# Patient Record
Sex: FEMALE | HISPANIC OR LATINO | Employment: OTHER | URBAN - METROPOLITAN AREA
[De-identification: names, ages, dates, MRNs, and addresses within clinical notes are randomized per-mention and may not be internally consistent; named-entity substitution may affect disease eponyms.]

---

## 2023-06-01 ENCOUNTER — HOSPITAL ENCOUNTER (EMERGENCY)
Facility: CLINIC | Age: 23
Discharge: HOME | End: 2023-06-01
Attending: EMERGENCY MEDICINE | Admitting: EMERGENCY MEDICINE

## 2023-06-01 ENCOUNTER — APPOINTMENT (OUTPATIENT)
Dept: CT IMAGING | Facility: CLINIC | Age: 23
End: 2023-06-01
Attending: EMERGENCY MEDICINE

## 2023-06-01 VITALS
TEMPERATURE: 98.2 F | SYSTOLIC BLOOD PRESSURE: 103 MMHG | HEART RATE: 94 BPM | DIASTOLIC BLOOD PRESSURE: 46 MMHG | OXYGEN SATURATION: 100 % | RESPIRATION RATE: 16 BRPM

## 2023-06-01 DIAGNOSIS — R82.90 ABNORMAL URINALYSIS: ICD-10-CM

## 2023-06-01 DIAGNOSIS — R10.31 ABDOMINAL PAIN, RIGHT LOWER QUADRANT: ICD-10-CM

## 2023-06-01 LAB
ALBUMIN UR-MCNC: NEGATIVE MG/DL
ANION GAP SERPL CALCULATED.3IONS-SCNC: 9 MMOL/L (ref 7–15)
APPEARANCE UR: ABNORMAL
BACTERIA #/AREA URNS HPF: ABNORMAL /HPF
BASOPHILS # BLD AUTO: 0 10E3/UL (ref 0–0.2)
BASOPHILS NFR BLD AUTO: 0 %
BILIRUB UR QL STRIP: NEGATIVE
BUN SERPL-MCNC: 6 MG/DL (ref 6–20)
CALCIUM SERPL-MCNC: 9.3 MG/DL (ref 8.6–10)
CHLORIDE SERPL-SCNC: 102 MMOL/L (ref 98–107)
COLOR UR AUTO: ABNORMAL
CREAT SERPL-MCNC: 0.62 MG/DL (ref 0.51–0.95)
DEPRECATED HCO3 PLAS-SCNC: 24 MMOL/L (ref 22–29)
EOSINOPHIL # BLD AUTO: 0 10E3/UL (ref 0–0.7)
EOSINOPHIL NFR BLD AUTO: 0 %
ERYTHROCYTE [DISTWIDTH] IN BLOOD BY AUTOMATED COUNT: 12.8 % (ref 10–15)
GFR SERPL CREATININE-BSD FRML MDRD: >90 ML/MIN/1.73M2
GLUCOSE SERPL-MCNC: 122 MG/DL (ref 70–99)
GLUCOSE UR STRIP-MCNC: NEGATIVE MG/DL
HCG SERPL QL: NEGATIVE
HCT VFR BLD AUTO: 40 % (ref 35–47)
HGB BLD-MCNC: 13.3 G/DL (ref 11.7–15.7)
HGB UR QL STRIP: NEGATIVE
HOLD SPECIMEN: NORMAL
IMM GRANULOCYTES # BLD: 0.1 10E3/UL
IMM GRANULOCYTES NFR BLD: 1 %
KETONES UR STRIP-MCNC: NEGATIVE MG/DL
LEUKOCYTE ESTERASE UR QL STRIP: ABNORMAL
LYMPHOCYTES # BLD AUTO: 1.8 10E3/UL (ref 0.8–5.3)
LYMPHOCYTES NFR BLD AUTO: 17 %
MCH RBC QN AUTO: 31 PG (ref 26.5–33)
MCHC RBC AUTO-ENTMCNC: 33.3 G/DL (ref 31.5–36.5)
MCV RBC AUTO: 93 FL (ref 78–100)
MONOCYTES # BLD AUTO: 0.7 10E3/UL (ref 0–1.3)
MONOCYTES NFR BLD AUTO: 6 %
MUCOUS THREADS #/AREA URNS LPF: PRESENT /LPF
NEUTROPHILS # BLD AUTO: 8.1 10E3/UL (ref 1.6–8.3)
NEUTROPHILS NFR BLD AUTO: 76 %
NITRATE UR QL: NEGATIVE
NRBC # BLD AUTO: 0 10E3/UL
NRBC BLD AUTO-RTO: 0 /100
PH UR STRIP: 6 [PH] (ref 5–7)
PLATELET # BLD AUTO: 264 10E3/UL (ref 150–450)
POTASSIUM SERPL-SCNC: 4.2 MMOL/L (ref 3.4–5.3)
RBC # BLD AUTO: 4.29 10E6/UL (ref 3.8–5.2)
RBC URINE: 1 /HPF
SODIUM SERPL-SCNC: 135 MMOL/L (ref 136–145)
SP GR UR STRIP: 1.01 (ref 1–1.03)
SPERM #/AREA URNS HPF: PRESENT /HPF
SQUAMOUS EPITHELIAL: 9 /HPF
UROBILINOGEN UR STRIP-MCNC: NORMAL MG/DL
WBC # BLD AUTO: 10.7 10E3/UL (ref 4–11)
WBC URINE: 7 /HPF

## 2023-06-01 PROCEDURE — 96374 THER/PROPH/DIAG INJ IV PUSH: CPT | Mod: 59

## 2023-06-01 PROCEDURE — 87086 URINE CULTURE/COLONY COUNT: CPT | Performed by: EMERGENCY MEDICINE

## 2023-06-01 PROCEDURE — 250N000011 HC RX IP 250 OP 636: Performed by: EMERGENCY MEDICINE

## 2023-06-01 PROCEDURE — 84703 CHORIONIC GONADOTROPIN ASSAY: CPT | Performed by: EMERGENCY MEDICINE

## 2023-06-01 PROCEDURE — 82310 ASSAY OF CALCIUM: CPT | Performed by: EMERGENCY MEDICINE

## 2023-06-01 PROCEDURE — 74177 CT ABD & PELVIS W/CONTRAST: CPT

## 2023-06-01 PROCEDURE — 85014 HEMATOCRIT: CPT | Performed by: EMERGENCY MEDICINE

## 2023-06-01 PROCEDURE — 99285 EMERGENCY DEPT VISIT HI MDM: CPT | Mod: 25

## 2023-06-01 PROCEDURE — 81001 URINALYSIS AUTO W/SCOPE: CPT | Performed by: EMERGENCY MEDICINE

## 2023-06-01 PROCEDURE — 85025 COMPLETE CBC W/AUTO DIFF WBC: CPT | Performed by: EMERGENCY MEDICINE

## 2023-06-01 PROCEDURE — 96375 TX/PRO/DX INJ NEW DRUG ADDON: CPT

## 2023-06-01 PROCEDURE — 80048 BASIC METABOLIC PNL TOTAL CA: CPT | Performed by: EMERGENCY MEDICINE

## 2023-06-01 PROCEDURE — 36415 COLL VENOUS BLD VENIPUNCTURE: CPT | Performed by: EMERGENCY MEDICINE

## 2023-06-01 RX ORDER — IBUPROFEN 600 MG/1
600 TABLET, FILM COATED ORAL EVERY 6 HOURS PRN
Qty: 30 TABLET | Refills: 0 | Status: SHIPPED | OUTPATIENT
Start: 2023-06-01

## 2023-06-01 RX ORDER — IOPAMIDOL 755 MG/ML
500 INJECTION, SOLUTION INTRAVASCULAR ONCE
Status: COMPLETED | OUTPATIENT
Start: 2023-06-01 | End: 2023-06-01

## 2023-06-01 RX ORDER — HYDROMORPHONE HYDROCHLORIDE 1 MG/ML
0.5 INJECTION, SOLUTION INTRAMUSCULAR; INTRAVENOUS; SUBCUTANEOUS
Status: DISCONTINUED | OUTPATIENT
Start: 2023-06-01 | End: 2023-06-01 | Stop reason: HOSPADM

## 2023-06-01 RX ORDER — KETOROLAC TROMETHAMINE 15 MG/ML
15 INJECTION, SOLUTION INTRAMUSCULAR; INTRAVENOUS ONCE
Status: COMPLETED | OUTPATIENT
Start: 2023-06-01 | End: 2023-06-01

## 2023-06-01 RX ORDER — DICYCLOMINE HCL 20 MG
20 TABLET ORAL 4 TIMES DAILY PRN
Qty: 15 TABLET | Refills: 0 | Status: SHIPPED | OUTPATIENT
Start: 2023-06-01

## 2023-06-01 RX ADMIN — IOPAMIDOL 75 ML: 755 INJECTION, SOLUTION INTRAVENOUS at 14:22

## 2023-06-01 RX ADMIN — KETOROLAC TROMETHAMINE 15 MG: 15 INJECTION, SOLUTION INTRAMUSCULAR; INTRAVENOUS at 14:38

## 2023-06-01 RX ADMIN — HYDROMORPHONE HYDROCHLORIDE 0.5 MG: 1 INJECTION, SOLUTION INTRAMUSCULAR; INTRAVENOUS; SUBCUTANEOUS at 14:38

## 2023-06-01 ASSESSMENT — ACTIVITIES OF DAILY LIVING (ADL): ADLS_ACUITY_SCORE: 35

## 2023-06-01 NOTE — ED PROVIDER NOTES
History     Chief Complaint:  Flank Pain       HPI professional  utilized for history    Elaine Carmen is a 23 year old female presents with right flank/abdominal pain.  Patient had the gradual onset of pain beginning yesterday.  Pain begins in the right flank/low back and radiates to the right lower quadrant.  Pain has been relatively constant without any alleviating or aggravating factors.  She denies any nausea, vomiting, diarrhea, dysuria, urinary frequency, vaginal bleeding or discharge.  She reported fever but states that she felt warm and never documented a temperature.  She has no history of similar pain and denies prior intra-abdominal surgery.    Independent Historian:     None    Review of External Notes:  None    Medications:    None    Past Medical History:    None    Past Surgical History:    No past surgical history on file.       Physical Exam   Patient Vitals for the past 24 hrs:   BP Temp Temp src Pulse Resp SpO2   06/01/23 1608 103/46 -- -- 94 16 100 %   06/01/23 1444 105/80 -- -- 106 -- 98 %   06/01/23 1435 105/54 -- -- 104 -- 99 %   06/01/23 1012 119/69 98.2  F (36.8  C) Temporal 108 18 100 %        Physical Exam    Eyes:    Conjunctiva normal  Neck:    Supple, no meningismus.     CV:     Regular rate and rhythm.      No murmurs, rubs or gallops.     No lower extremity edema.  PULM:    Clear to auscultation bilateral.       No respiratory distress.      Good air exchange.     No rales or wheezing.  ABD:    Soft, non-distended.       Minimal tenderness to right lower quadrant     Bowel sounds normal.     No pulsatile masses.       No rebound, guarding or rigidity.     No CVA tenderness.   MSK:     No gross deformity to all four extremities.   LYMPH:   No cervical lymphadenopathy.  NEURO:   Alert.  Good muscular tone, no atrophy.   Skin:    Warm, dry and intact.    Psych:    Mood is good and affect is appropriate.      Emergency Department Course     Imaging:  CT  Abdomen Pelvis w Contrast   Final Result   IMPRESSION:    1. No evidence of acute pathology in the abdomen or pelvis. The   appendix is visualized and appears normal.   2. Prominent pelvic vasculature, nonspecific, can be seen with pelvic   congestion syndrome.      DEYSI YI MD            SYSTEM ID:  [id number]        Report per radiology    Laboratory:  Labs Ordered and Resulted from Time of ED Arrival to Time of ED Departure   ROUTINE UA WITH MICROSCOPIC REFLEX TO CULTURE - Abnormal       Result Value    Color Urine Light Yellow      Appearance Urine Slightly Cloudy (*)     Glucose Urine Negative      Bilirubin Urine Negative      Ketones Urine Negative      Specific Gravity Urine 1.009      Blood Urine Negative      pH Urine 6.0      Protein Albumin Urine Negative      Urobilinogen Urine Normal      Nitrite Urine Negative      Leukocyte Esterase Urine Small (*)     Bacteria Urine Few (*)     Mucus Urine Present (*)     Sperm Urine Present (*)     RBC Urine 1      WBC Urine 7 (*)     Squamous Epithelials Urine 9 (*)    BASIC METABOLIC PANEL - Abnormal    Sodium 135 (*)     Potassium 4.2      Chloride 102      Carbon Dioxide (CO2) 24      Anion Gap 9      Urea Nitrogen 6.0      Creatinine 0.62      Calcium 9.3      Glucose 122 (*)     GFR Estimate >90     HCG QUALITATIVE PREGNANCY - Normal    hCG Serum Qualitative Negative     CBC WITH PLATELETS AND DIFFERENTIAL    WBC Count 10.7      RBC Count 4.29      Hemoglobin 13.3      Hematocrit 40.0      MCV 93      MCH 31.0      MCHC 33.3      RDW 12.8      Platelet Count 264      % Neutrophils 76      % Lymphocytes 17      % Monocytes 6      % Eosinophils 0      % Basophils 0      % Immature Granulocytes 1      NRBCs per 100 WBC 0      Absolute Neutrophils 8.1      Absolute Lymphocytes 1.8      Absolute Monocytes 0.7      Absolute Eosinophils 0.0      Absolute Basophils 0.0      Absolute Immature Granulocytes 0.1      Absolute NRBCs 0.0     URINE CULTURE             Emergency Department Course & Assessments:      Interventions:  Medications   HYDROmorphone (PF) (DILAUDID) injection 0.5 mg (0.5 mg Intravenous $Given 23 1438)   ketorolac (TORADOL) injection 15 mg (15 mg Intravenous $Given 23 1438)   sodium chloride (PF) 0.9% PF flush 100 mL (59 mLs Intravenous $Given 23 1422)   iopamidol (ISOVUE-370) solution 500 mL (75 mLs Intravenous $Given 23 1422)        Independent Interpretation (X-rays, CTs, rhythm strip):  None    Consultations/Discussion of Management or Tests:  None    Social Determinants of Health affecting care:  None    Disposition:  The patient was discharged to home.     Impression & Plan        Medical Decision Makin-year-old female presents with gradual onset of right flank/right lower quadrant abdominal pain.  Basic laboratory studies were unremarkable outside a contaminated urine specimen.  She is not having dysuria or urinary frequency to suggest UTI thus will send urine culture but not initiate antibiotics.  CT scan of the abdomen is unremarkable outside pelvic congestion.  Patient has complete resolution of symptoms with Toradol.  Differential diagnosis would include IBS, IBD, viral illness, enteritis, colitis, endometriosis, pelvic congestion syndrome.  She has no historical features to suggest PID.  Patient will discharge home with supportive measures and return to ED for any worsening symptoms.  Follow-up with PCP to ensure improvement.    Diagnosis:    ICD-10-CM    1. Abdominal pain, right lower quadrant  R10.31       2. Abnormal urinalysis  R82.90            Discharge Medications:  Discharge Medication List as of 2023  4:01 PM      START taking these medications    Details   dicyclomine (BENTYL) 20 MG tablet Take 1 tablet (20 mg) by mouth 4 times daily as needed (abdominal pain), Disp-15 tablet, R-0, Local Print      ibuprofen (ADVIL/MOTRIN) 600 MG tablet Take 1 tablet (600 mg) by mouth every 6 hours as needed for  moderate pain, Disp-30 tablet, R-0, Local Print              Chad Dhillon MD, MD  06/01/23 0364

## 2023-06-01 NOTE — ED TRIAGE NOTES
Right flank pain. Started yesterday. Reports fever at home. Denies N/V/D. No problems with urination. VSS. ABCs intact. A/Ox4.

## 2023-06-03 ENCOUNTER — TELEPHONE (OUTPATIENT)
Dept: EMERGENCY MEDICINE | Facility: CLINIC | Age: 23
End: 2023-06-03

## 2023-06-03 LAB
BACTERIA UR CULT: ABNORMAL
BACTERIA UR CULT: ABNORMAL

## 2023-06-03 NOTE — RESULT ENCOUNTER NOTE
Phone number is invalid.  Unable to leave a voicemail message requesting a call back to [facility] ED Lab Result RN at [phone].  RN is available every day between 9 a.m. and 5:30 p.m.    Letter sent requesting a call back

## 2023-06-03 NOTE — LETTER
Mallory 3, 2023        Elaine Carmen  [address]   [place] [address]          Dear Elaine Carmen:    You were seen in the [facility] Emergency Department at [facility] EMERGENCY DEPT on 6/1/2023.  We are unable to reach you by phone, so we are sending you this letter.     It is important that you call [facility] Emergency Department lab result nurse at [phone], as we have information to relay to you AND/OR we MAY have to make some changes in your treatment.    Best time to call back is between 9AM and 5:30PM, 7 days a week.      Sincerely,     [facility] Emergency Department Lab Result RN  [phone]

## 2023-06-03 NOTE — RESULT ENCOUNTER NOTE
Preliminary urine culture report on 6/3/23 shows the presence of bacteria(s):  [1] >100,000 CFU/ML Escherichia coli AND [2] >100,000 CFU/ML gram negative bacilli   Emergency Dept/Urgent Care discharge antibiotic: None  Recommendations per [facility] ED Lab result Urine culture protocol.

## 2023-06-03 NOTE — TELEPHONE ENCOUNTER
[facility] Emergency Department/Urgent Care Lab result notification  [Note:  ED Lab Results RN will reference the [facility] Emergency Dept visit note prior to contacting patient AND/OR prior to consulting Emergency Dept Provider.  Highlights of Emergency Dept visit in information summary at the bottom of this telephone note]    1. Reason for call    Notify of lab results    Assess patient symptoms [if necessary]    Review ED Providers recommendations/discharge instructions (if necessary)    Advise per [facility] ED lab result protocol    2. Lab Result (including Rx patient on, if applicable).  If culture, copy of lab report at bottom.  Preliminary urine culture report on 6/3/23 shows the presence of bacteria(s):  [1] >100,000 CFU/ML Escherichia coli AND [2] >100,000 CFU/ML gram negative bacilli   Emergency Dept/Urgent Care discharge antibiotic: None  Recommendations per [facility] Lab result Urine culture protocol.    3. RN Assessment (Patient's current Symptoms):    Time of call: 2:25P via .      4. RN Recommendations/Instructions per [place] ED lab result protocol    [facility] ED lab result protocol used: Urine culture    Elaine was not notified of lab result and treatment recommendations.  PHone number is invalid.  RN will sent letter requesting a call back    Information summary from Emergency Dept/Urgent Care visit on 6/1/23  Symptoms reported at ED visit (Chief complaint, HPI) Chief Complaint:  Flank Pain      HPI professional  utilized for history     Elaine Carmen is a 23 year old female presents with right flank/abdominal pain.  Patient had the gradual onset of pain beginning yesterday.  Pain begins in the right flank/low back and radiates to the right lower quadrant.  Pain has been relatively constant without any alleviating or aggravating factors.  She denies any nausea, vomiting, diarrhea, dysuria, urinary frequency,  vaginal bleeding or discharge.  She reported fever but states that she felt warm and never documented a temperature.  She has no history of similar pain and denies prior intra-abdominal surgery.   Significant Medical hx, if applicable (i.e. CKD, diabetes) Reviewed   Allergies No Known Allergies   Weight, if applicable Wt Readings from Last 2 Encounters:   No data found for Wt      Coumadin/Warfarin [Yes /No] No   Creatinine Level (mg/dl) Creatinine   Date Value Ref Range Status   2023 0.62 0.51 - 0.95 mg/dL Final      Creatinine clearance (ml/min), if applicable Creatinine clearance cannot be calculated (Unknown ideal weight.)   Pregnant (Yes/No/NA) HCG qual Negative   Breastfeeding (Yes/No/NA) ?   ED providers Impression and Plan (applicable information) Medical Decision Makin-year-old female presents with gradual onset of right flank/right lower quadrant abdominal pain.  Basic laboratory studies were unremarkable outside a contaminated urine specimen.  She is not having dysuria or urinary frequency to suggest UTI thus will send urine culture but not initiate antibiotics.  CT scan of the abdomen is unremarkable outside pelvic congestion.  Patient has complete resolution of symptoms with Toradol.  Differential diagnosis would include IBS, IBD, viral illness, enteritis, colitis, endometriosis, pelvic congestion syndrome.  She has no historical features to suggest PID.  Patient will discharge home with supportive measures and return to ED for any worsening symptoms.  Follow-up with PCP to ensure improvement.   ED diagnosis  Abdominal pain, right lower quadrant  Abnormal urinalysis   ED provider Chad Mai MD        Copy of Lab report (if applicable)        Alex Sepulveda RN  [facility]  Emergency Dept Lab Result RN  Ph# [phone]

## 2023-06-04 NOTE — RESULT ENCOUNTER NOTE
Final urine culture on 6/3/23 shows the presence of bacteria(s): [1] >100,000 CFU/ML Escherichia coli  AND [2] >100,000 CFU/ML Escherichia coli   [facility] Emergency Dept discharge antibiotic: None  Recommendations in treatment per [facility] ED lab result Urine Culture protocol.    Unable to reach as phone number is invalid.  Letter to be sent 6/5/23

## 2023-06-07 NOTE — TELEPHONE ENCOUNTER
[facility] Emergency Department/Urgent Care Lab result notification  [Note:  ED Lab Results RN will reference the [facility] Emergency Dept visit note prior to contacting patient AND/OR prior to consulting Emergency Dept Provider.  Highlights of Emergency Dept visit in information summary at the bottom of this telephone note]    1. Reason for call    Notify of lab results    Assess patient symptoms [if necessary]    Review ED Providers recommendations/discharge instructions (if necessary)    Advise per [facility] ED lab result protocol    2. Lab Result (including Rx patient on, if applicable).  If culture, copy of lab report at bottom.  Final urine culture on 6/3/23 shows the presence of bacteria(s): [1] >100,000 CFU/ML Escherichia coli  AND [2] >100,000 CFU/ML Escherichia coli   [facility] Emergency Dept discharge antibiotic: None  Recommendations in treatment per [facility] lab result Urine Culture protocol.       3. RN Assessment (Patient's current Symptoms):    Time of call: 4:56p patient calling in regards to letter received from ED lab results department,  used [phone] Magalie    Assessment: Relayed urine culture results to patient. She denies any pain or frequency or urgency, no fever chills. Discussed with patient that if she does not have symptoms she does not need to start antibiotics, to retest her urine in 1 week. Patient stated she has a clinic that she can go to to have her urine checked again.    4. RN Recommendations/Instructions per [place] ED lab result protocol    [facility] ED lab result protocol used: Urine Culture    Elaine was notified of lab result and treatment recommendations    Advised to discontinue current antibiotic NA      5. Please Contact your PCP clinic or return to the Emergency department if your:    Symptoms return.    Symptoms do not improve after 3 days on antibiotic.    Symptoms do not resolve after completing  antibiotic.    Symptoms worsen or other concerning symptoms.      Copy of Lab report (if applicable)      Rebecca Buckner RN  [facility]  Emergency Dept Lab Result RN  Ph# [phone]

## 2023-12-14 ENCOUNTER — APPOINTMENT (OUTPATIENT)
Dept: GENERAL RADIOLOGY | Facility: CLINIC | Age: 23
End: 2023-12-14
Attending: EMERGENCY MEDICINE

## 2023-12-14 ENCOUNTER — HOSPITAL ENCOUNTER (EMERGENCY)
Facility: CLINIC | Age: 23
Discharge: HOME | End: 2023-12-14
Attending: EMERGENCY MEDICINE | Admitting: EMERGENCY MEDICINE

## 2023-12-14 ENCOUNTER — APPOINTMENT (OUTPATIENT)
Dept: ULTRASOUND IMAGING | Facility: CLINIC | Age: 23
End: 2023-12-14
Attending: EMERGENCY MEDICINE

## 2023-12-14 VITALS
DIASTOLIC BLOOD PRESSURE: 66 MMHG | HEART RATE: 75 BPM | TEMPERATURE: 98.2 F | RESPIRATION RATE: 16 BRPM | OXYGEN SATURATION: 100 % | SYSTOLIC BLOOD PRESSURE: 109 MMHG | WEIGHT: 145 LBS

## 2023-12-14 DIAGNOSIS — R10.11 RIGHT UPPER QUADRANT PAIN: ICD-10-CM

## 2023-12-14 LAB
ALBUMIN SERPL BCG-MCNC: 4 G/DL (ref 3.5–5.2)
ALBUMIN UR-MCNC: NEGATIVE MG/DL
ALP SERPL-CCNC: 53 U/L (ref 40–150)
ALT SERPL W P-5'-P-CCNC: 22 U/L (ref 0–50)
ANION GAP SERPL CALCULATED.3IONS-SCNC: 11 MMOL/L (ref 7–15)
APPEARANCE UR: ABNORMAL
AST SERPL W P-5'-P-CCNC: 20 U/L (ref 0–45)
BACTERIA #/AREA URNS HPF: ABNORMAL /HPF
BASOPHILS # BLD AUTO: 0 10E3/UL (ref 0–0.2)
BASOPHILS NFR BLD AUTO: 0 %
BILIRUB DIRECT SERPL-MCNC: <0.2 MG/DL (ref 0–0.3)
BILIRUB SERPL-MCNC: 0.3 MG/DL
BILIRUB UR QL STRIP: NEGATIVE
BUN SERPL-MCNC: 9.1 MG/DL (ref 6–20)
CALCIUM SERPL-MCNC: 8.4 MG/DL (ref 8.6–10)
CHLORIDE SERPL-SCNC: 105 MMOL/L (ref 98–107)
COLOR UR AUTO: YELLOW
CREAT SERPL-MCNC: 0.58 MG/DL (ref 0.51–0.95)
D DIMER PPP FEU-MCNC: 0.37 UG/ML FEU (ref 0–0.5)
DEPRECATED HCO3 PLAS-SCNC: 23 MMOL/L (ref 22–29)
EGFRCR SERPLBLD CKD-EPI 2021: >90 ML/MIN/1.73M2
EOSINOPHIL # BLD AUTO: 0.2 10E3/UL (ref 0–0.7)
EOSINOPHIL NFR BLD AUTO: 2 %
ERYTHROCYTE [DISTWIDTH] IN BLOOD BY AUTOMATED COUNT: 13.2 % (ref 10–15)
GLUCOSE SERPL-MCNC: 85 MG/DL (ref 70–99)
GLUCOSE UR STRIP-MCNC: NEGATIVE MG/DL
HCG SERPL QL: NEGATIVE
HCT VFR BLD AUTO: 39.7 % (ref 35–47)
HGB BLD-MCNC: 12.9 G/DL (ref 11.7–15.7)
HGB UR QL STRIP: ABNORMAL
IMM GRANULOCYTES # BLD: 0 10E3/UL
IMM GRANULOCYTES NFR BLD: 0 %
KETONES UR STRIP-MCNC: NEGATIVE MG/DL
LEUKOCYTE ESTERASE UR QL STRIP: ABNORMAL
LIPASE SERPL-CCNC: 30 U/L (ref 13–60)
LYMPHOCYTES # BLD AUTO: 1.9 10E3/UL (ref 0.8–5.3)
LYMPHOCYTES NFR BLD AUTO: 21 %
MCH RBC QN AUTO: 30.5 PG (ref 26.5–33)
MCHC RBC AUTO-ENTMCNC: 32.5 G/DL (ref 31.5–36.5)
MCV RBC AUTO: 94 FL (ref 78–100)
MONOCYTES # BLD AUTO: 0.8 10E3/UL (ref 0–1.3)
MONOCYTES NFR BLD AUTO: 9 %
MUCOUS THREADS #/AREA URNS LPF: PRESENT /LPF
NEUTROPHILS # BLD AUTO: 6.1 10E3/UL (ref 1.6–8.3)
NEUTROPHILS NFR BLD AUTO: 68 %
NITRATE UR QL: NEGATIVE
NRBC # BLD AUTO: 0 10E3/UL
NRBC BLD AUTO-RTO: 0 /100
PH UR STRIP: 6 [PH] (ref 5–7)
PLATELET # BLD AUTO: 266 10E3/UL (ref 150–450)
POTASSIUM SERPL-SCNC: 4.3 MMOL/L (ref 3.4–5.3)
PROT SERPL-MCNC: 7.4 G/DL (ref 6.4–8.3)
RBC # BLD AUTO: 4.23 10E6/UL (ref 3.8–5.2)
RBC URINE: 6 /HPF
SODIUM SERPL-SCNC: 139 MMOL/L (ref 135–145)
SP GR UR STRIP: 1.02 (ref 1–1.03)
SQUAMOUS EPITHELIAL: 24 /HPF
UROBILINOGEN UR STRIP-MCNC: NORMAL MG/DL
WBC # BLD AUTO: 9.1 10E3/UL (ref 4–11)
WBC URINE: 5 /HPF

## 2023-12-14 PROCEDURE — 93005 ELECTROCARDIOGRAM TRACING: CPT

## 2023-12-14 PROCEDURE — 83690 ASSAY OF LIPASE: CPT | Performed by: EMERGENCY MEDICINE

## 2023-12-14 PROCEDURE — 82310 ASSAY OF CALCIUM: CPT | Performed by: EMERGENCY MEDICINE

## 2023-12-14 PROCEDURE — 81001 URINALYSIS AUTO W/SCOPE: CPT | Performed by: EMERGENCY MEDICINE

## 2023-12-14 PROCEDURE — 76705 ECHO EXAM OF ABDOMEN: CPT

## 2023-12-14 PROCEDURE — 96361 HYDRATE IV INFUSION ADD-ON: CPT

## 2023-12-14 PROCEDURE — 250N000011 HC RX IP 250 OP 636: Performed by: EMERGENCY MEDICINE

## 2023-12-14 PROCEDURE — 36415 COLL VENOUS BLD VENIPUNCTURE: CPT | Performed by: EMERGENCY MEDICINE

## 2023-12-14 PROCEDURE — 71046 X-RAY EXAM CHEST 2 VIEWS: CPT

## 2023-12-14 PROCEDURE — 85025 COMPLETE CBC W/AUTO DIFF WBC: CPT | Performed by: EMERGENCY MEDICINE

## 2023-12-14 PROCEDURE — 85379 FIBRIN DEGRADATION QUANT: CPT | Performed by: EMERGENCY MEDICINE

## 2023-12-14 PROCEDURE — 84703 CHORIONIC GONADOTROPIN ASSAY: CPT | Performed by: EMERGENCY MEDICINE

## 2023-12-14 PROCEDURE — 258N000003 HC RX IP 258 OP 636: Performed by: EMERGENCY MEDICINE

## 2023-12-14 PROCEDURE — 99285 EMERGENCY DEPT VISIT HI MDM: CPT | Mod: 25

## 2023-12-14 PROCEDURE — 96374 THER/PROPH/DIAG INJ IV PUSH: CPT

## 2023-12-14 PROCEDURE — 82248 BILIRUBIN DIRECT: CPT | Performed by: EMERGENCY MEDICINE

## 2023-12-14 RX ORDER — KETOROLAC TROMETHAMINE 15 MG/ML
15 INJECTION, SOLUTION INTRAMUSCULAR; INTRAVENOUS ONCE
Status: COMPLETED | OUTPATIENT
Start: 2023-12-14 | End: 2023-12-14

## 2023-12-14 RX ADMIN — KETOROLAC TROMETHAMINE 15 MG: 15 INJECTION, SOLUTION INTRAMUSCULAR; INTRAVENOUS at 10:31

## 2023-12-14 RX ADMIN — SODIUM CHLORIDE 1000 ML: 9 INJECTION, SOLUTION INTRAVENOUS at 10:30

## 2023-12-14 ASSESSMENT — ACTIVITIES OF DAILY LIVING (ADL): ADLS_ACUITY_SCORE: 35

## 2023-12-14 NOTE — ED PROVIDER NOTES
History   Chief Complaint:  RUQ pain  HPI   History supplemented by electronic chart review    Elaine Carmen is a 23 year old female who presents for evaluation of right upper quadrant pain that has been present for 3 days, constant, associated with slightly decreased appetite and worse pain when she takes deep breath.  No history of DVT or PE.  No cough.  She denies trouble breathing or chest pain.  Her last menstrual period was at the end of November, normal for her.  She has not taken any medicine at home.  She lives with a friend who has been feeling fine.  She is not aware of any family history of gallbladder problems and she has never been told she had gallbladder problems.  No prior abdominal surgeries.  No urinary symptoms.  She would like to know what is causing the pain.   No alcohol or tobacco.    Review of External Notes: I person for electronic chart review, in June she had a CT of the abdomen pelvis that was benign other than showing possible suggestion of congestion syndrome, she was prescribed Bentyl.  Review of the  is negative.    Medications:    dicyclomine (BENTYL) 20 MG tablet  ibuprofen (ADVIL/MOTRIN) 600 MG tablet      Past Medical History:    No chronic medical problems  Physical Exam   Patient Vitals for the past 24 hrs:   BP Temp Temp src Pulse Resp SpO2 Weight   12/14/23 1218 -- -- -- -- -- 100 % --   12/14/23 1213 109/66 -- -- 75 -- -- --   12/14/23 1117 108/76 -- -- 76 -- 100 % --   12/14/23 0936 -- -- -- -- -- -- 65.8 kg (145 lb)   12/14/23 0932 108/70 98.2  F (36.8  C) Temporal 91 16 100 % --      Physical Exam  General: nontoxic appearing woman sitting upright in room 39  HENT: mucous membranes moist   CV: rate as above, no murmur audible  Resp: normal effort, speaks in full phrases, no stridor, no cough observed  GI: abdomen soft, mild tenderness in RUQ with equivocal Maldonado's, no distension  MSK: no bony tenderness, no CVAT  Skin: appropriately warm and dry, no  abdominal rash  Neuro: alert, clear speech, oriented   Psych: cooperative    Emergency Department Course   ECG taken at 1114, ECG interpreted at 1326 by BETH Sanford MD  Sinus rhythm, T wave inversions in lead III, no ST elevation or depression  Rate 73 bpm. RI interval 132. QRS duration 90. QTc 423    Imaging:  XR Chest 2 Views   Final Result   IMPRESSION: Normal.      TRAVIS DELA CRUZ MD            SYSTEM ID:  [id number]      US Abdomen Limited   Final Result   IMPRESSION:  Normal limited abdominal ultrasound.      GEORGE HSU MD            SYSTEM ID:  [id number]         Laboratory:  Labs Ordered and Resulted from Time of ED Arrival to Time of ED Departure   BASIC METABOLIC PANEL - Abnormal       Result Value    Sodium 139      Potassium 4.3      Chloride 105      Carbon Dioxide (CO2) 23      Anion Gap 11      Urea Nitrogen 9.1      Creatinine 0.58      GFR Estimate >90      Calcium 8.4 (*)     Glucose 85     ROUTINE UA WITH MICROSCOPIC - Abnormal    Color Urine Yellow      Appearance Urine Slightly Cloudy (*)     Glucose Urine Negative      Bilirubin Urine Negative      Ketones Urine Negative      Specific Gravity Urine 1.019      Blood Urine Trace (*)     pH Urine 6.0      Protein Albumin Urine Negative      Urobilinogen Urine Normal      Nitrite Urine Negative      Leukocyte Esterase Urine Moderate (*)     Bacteria Urine Few (*)     Mucus Urine Present (*)     RBC Urine 6 (*)     WBC Urine 5      Squamous Epithelials Urine 24 (*)    LIPASE - Normal    Lipase 30     HCG QUALITATIVE PREGNANCY - Normal    hCG Serum Qualitative Negative     HEPATIC FUNCTION PANEL - Normal    Protein Total 7.4      Albumin 4.0      Bilirubin Total 0.3      Alkaline Phosphatase 53      AST 20      ALT 22      Bilirubin Direct <0.20     D DIMER QUANTITATIVE - Normal    D-Dimer Quantitative 0.37     CBC WITH PLATELETS AND DIFFERENTIAL    WBC Count 9.1      RBC Count 4.23      Hemoglobin 12.9      Hematocrit 39.7      MCV 94      MCH 30.5       MCHC 32.5      RDW 13.2      Platelet Count 266      % Neutrophils 68      % Lymphocytes 21      % Monocytes 9      % Eosinophils 2      % Basophils 0      % Immature Granulocytes 0      NRBCs per 100 WBC 0      Absolute Neutrophils 6.1      Absolute Lymphocytes 1.9      Absolute Monocytes 0.8      Absolute Eosinophils 0.2      Absolute Basophils 0.0      Absolute Immature Granulocytes 0.0      Absolute NRBCs 0.0        Emergency Department Course:  Reviewed:  I reviewed nursing notes, vitals, and past medical history    Assessments/Consultations/Discussion of Management or Tests :  I obtained history and examined the patient as noted above.   ED Course as of 12/14/23 1859   Thu Dec 14, 2023   1241 I rechecked patient, discussed test results.     Independent Interpretation (X-rays, CTs, rhythm strip):  I personally reviewed images of the     Interventions:  Medications   sodium chloride 0.9% BOLUS 1,000 mL (0 mLs Intravenous Stopped 12/14/23 1242)   ketorolac (TORADOL) injection 15 mg (15 mg Intravenous $Given 12/14/23 1031)      Social Determinants of Health affecting care:   Healthcare Access/Compliance    Disposition:  Discharge    Impression & Plan    Medical Decision Making:  Initial differential diagnosis included cholecystitis, pancreatitis, biliary colic, renal colic and a variety of others.  With pleuritic pain, pulmonary embolism was considered though clinical suspicion was low and her D-dimer is normal.  Chest x-ray shows no pneumothorax or infiltrate.  Urinalysis somewhat contaminated, though no concerning degree of pyuria or hematuria.  I do not think that CT of the abdomen is indicated given her reassuring findings.  Repeat exam is benign.  She is tolerating oral intake.  Vital signs excellent.  The diagnostic uncertainty was reviewed with her along with recommendation for close outpatient follow-up and return precautions for sudden worsening.  She was discharged home in improved  condition.    Diagnosis:    ICD-10-CM    1. Right upper quadrant pain  R10.11          12/14/2023   MD Claribel Yepez, Pal Mallory MD  12/14/23 1902

## 2023-12-14 NOTE — ED TRIAGE NOTES
Patient reports abdominal pain under the ribs on the right side. Denies any trauma. No changes in bowel or bladder. Reports there is pain when she takes a deep breath in. ABCs intact in triage.

## 2023-12-15 LAB
ATRIAL RATE - MUSE: 73 BPM
DIASTOLIC BLOOD PRESSURE - MUSE: NORMAL MMHG
INTERPRETATION ECG - MUSE: NORMAL
P AXIS - MUSE: 19 DEGREES
PR INTERVAL - MUSE: 132 MS
QRS DURATION - MUSE: 90 MS
QT - MUSE: 384 MS
QTC - MUSE: 423 MS
R AXIS - MUSE: 57 DEGREES
SYSTOLIC BLOOD PRESSURE - MUSE: NORMAL MMHG
T AXIS - MUSE: 18 DEGREES
VENTRICULAR RATE- MUSE: 73 BPM